# Patient Record
Sex: MALE | Race: WHITE | NOT HISPANIC OR LATINO | Employment: FULL TIME | URBAN - METROPOLITAN AREA
[De-identification: names, ages, dates, MRNs, and addresses within clinical notes are randomized per-mention and may not be internally consistent; named-entity substitution may affect disease eponyms.]

---

## 2022-02-18 ENCOUNTER — HOSPITAL ENCOUNTER (EMERGENCY)
Facility: CLINIC | Age: 57
Discharge: HOME | End: 2022-02-18
Attending: EMERGENCY MEDICINE | Admitting: EMERGENCY MEDICINE
Payer: COMMERCIAL

## 2022-02-18 ENCOUNTER — APPOINTMENT (OUTPATIENT)
Dept: CT IMAGING | Facility: CLINIC | Age: 57
End: 2022-02-18
Attending: EMERGENCY MEDICINE
Payer: COMMERCIAL

## 2022-02-18 ENCOUNTER — APPOINTMENT (OUTPATIENT)
Dept: ULTRASOUND IMAGING | Facility: CLINIC | Age: 57
End: 2022-02-18
Attending: EMERGENCY MEDICINE
Payer: COMMERCIAL

## 2022-02-18 VITALS
TEMPERATURE: 98.5 F | OXYGEN SATURATION: 92 % | HEART RATE: 64 BPM | SYSTOLIC BLOOD PRESSURE: 106 MMHG | WEIGHT: 220 LBS | HEIGHT: 68 IN | BODY MASS INDEX: 33.34 KG/M2 | DIASTOLIC BLOOD PRESSURE: 68 MMHG | RESPIRATION RATE: 25 BRPM

## 2022-02-18 DIAGNOSIS — K85.90 ACUTE PANCREATITIS, UNSPECIFIED COMPLICATION STATUS, UNSPECIFIED PANCREATITIS TYPE: ICD-10-CM

## 2022-02-18 LAB
ALBUMIN SERPL-MCNC: 3.8 G/DL (ref 3.5–5)
ALP SERPL-CCNC: 56 U/L (ref 45–120)
ALT SERPL W P-5'-P-CCNC: 28 U/L (ref 0–45)
ANION GAP SERPL CALCULATED.3IONS-SCNC: 10 MMOL/L (ref 5–18)
AST SERPL W P-5'-P-CCNC: 24 U/L (ref 0–40)
BASOPHILS # BLD AUTO: 0 10E3/UL (ref 0–0.2)
BASOPHILS NFR BLD AUTO: 0 %
BILIRUB DIRECT SERPL-MCNC: 0.2 MG/DL
BILIRUB SERPL-MCNC: 0.6 MG/DL (ref 0–1)
BUN SERPL-MCNC: 13 MG/DL (ref 8–22)
CALCIUM SERPL-MCNC: 9.4 MG/DL (ref 8.5–10.5)
CHLORIDE BLD-SCNC: 101 MMOL/L (ref 98–107)
CO2 SERPL-SCNC: 28 MMOL/L (ref 22–31)
CREAT SERPL-MCNC: 0.87 MG/DL (ref 0.7–1.3)
EOSINOPHIL # BLD AUTO: 0.1 10E3/UL (ref 0–0.7)
EOSINOPHIL NFR BLD AUTO: 1 %
ERYTHROCYTE [DISTWIDTH] IN BLOOD BY AUTOMATED COUNT: 13.1 % (ref 10–15)
GFR SERPL CREATININE-BSD FRML MDRD: >90 ML/MIN/1.73M2
GLUCOSE BLD-MCNC: 148 MG/DL (ref 70–125)
HCT VFR BLD AUTO: 44.1 % (ref 40–53)
HGB BLD-MCNC: 15.3 G/DL (ref 13.3–17.7)
IMM GRANULOCYTES # BLD: 0 10E3/UL
IMM GRANULOCYTES NFR BLD: 0 %
LIPASE SERPL-CCNC: 1116 U/L (ref 0–52)
LYMPHOCYTES # BLD AUTO: 2.2 10E3/UL (ref 0.8–5.3)
LYMPHOCYTES NFR BLD AUTO: 22 %
MCH RBC QN AUTO: 30.1 PG (ref 26.5–33)
MCHC RBC AUTO-ENTMCNC: 34.7 G/DL (ref 31.5–36.5)
MCV RBC AUTO: 87 FL (ref 78–100)
MONOCYTES # BLD AUTO: 0.9 10E3/UL (ref 0–1.3)
MONOCYTES NFR BLD AUTO: 10 %
NEUTROPHILS # BLD AUTO: 6.7 10E3/UL (ref 1.6–8.3)
NEUTROPHILS NFR BLD AUTO: 67 %
NRBC # BLD AUTO: 0 10E3/UL
NRBC BLD AUTO-RTO: 0 /100
PLATELET # BLD AUTO: 245 10E3/UL (ref 150–450)
POTASSIUM BLD-SCNC: 3.7 MMOL/L (ref 3.5–5)
PROT SERPL-MCNC: 7.2 G/DL (ref 6–8)
RBC # BLD AUTO: 5.09 10E6/UL (ref 4.4–5.9)
SODIUM SERPL-SCNC: 139 MMOL/L (ref 136–145)
TRIGL SERPL-MCNC: 137 MG/DL
WBC # BLD AUTO: 9.9 10E3/UL (ref 4–11)

## 2022-02-18 PROCEDURE — 83690 ASSAY OF LIPASE: CPT | Performed by: EMERGENCY MEDICINE

## 2022-02-18 PROCEDURE — 96374 THER/PROPH/DIAG INJ IV PUSH: CPT | Mod: 59

## 2022-02-18 PROCEDURE — 84478 ASSAY OF TRIGLYCERIDES: CPT | Performed by: EMERGENCY MEDICINE

## 2022-02-18 PROCEDURE — 250N000011 HC RX IP 250 OP 636: Performed by: EMERGENCY MEDICINE

## 2022-02-18 PROCEDURE — 76705 ECHO EXAM OF ABDOMEN: CPT

## 2022-02-18 PROCEDURE — 85049 AUTOMATED PLATELET COUNT: CPT | Performed by: EMERGENCY MEDICINE

## 2022-02-18 PROCEDURE — 74177 CT ABD & PELVIS W/CONTRAST: CPT

## 2022-02-18 PROCEDURE — 96361 HYDRATE IV INFUSION ADD-ON: CPT

## 2022-02-18 PROCEDURE — 96375 TX/PRO/DX INJ NEW DRUG ADDON: CPT

## 2022-02-18 PROCEDURE — 93005 ELECTROCARDIOGRAM TRACING: CPT | Performed by: EMERGENCY MEDICINE

## 2022-02-18 PROCEDURE — 82248 BILIRUBIN DIRECT: CPT | Performed by: EMERGENCY MEDICINE

## 2022-02-18 PROCEDURE — 99285 EMERGENCY DEPT VISIT HI MDM: CPT | Mod: 25

## 2022-02-18 PROCEDURE — 36415 COLL VENOUS BLD VENIPUNCTURE: CPT | Performed by: EMERGENCY MEDICINE

## 2022-02-18 PROCEDURE — 258N000003 HC RX IP 258 OP 636: Performed by: EMERGENCY MEDICINE

## 2022-02-18 RX ORDER — PRAZOSIN HYDROCHLORIDE 1 MG/1
1 CAPSULE ORAL EVERY EVENING
COMMUNITY
Start: 2022-02-06

## 2022-02-18 RX ORDER — FLUTICASONE PROPIONATE 50 MCG
2 SPRAY, SUSPENSION (ML) NASAL AT BEDTIME
COMMUNITY
Start: 2022-02-06

## 2022-02-18 RX ORDER — OXYCODONE HYDROCHLORIDE 5 MG/1
5 TABLET ORAL EVERY 6 HOURS PRN
Qty: 12 TABLET | Refills: 0 | Status: SHIPPED | OUTPATIENT
Start: 2022-02-18 | End: 2022-02-21

## 2022-02-18 RX ORDER — ZIPRASIDONE HYDROCHLORIDE 20 MG/1
20 CAPSULE ORAL DAILY
COMMUNITY
Start: 2021-12-22 | End: 2022-02-18

## 2022-02-18 RX ORDER — SUMATRIPTAN 100 MG/1
100 TABLET, FILM COATED ORAL DAILY PRN
COMMUNITY
Start: 2022-02-06

## 2022-02-18 RX ORDER — ONDANSETRON 4 MG/1
4 TABLET, ORALLY DISINTEGRATING ORAL EVERY 6 HOURS PRN
Qty: 12 TABLET | Refills: 0 | Status: SHIPPED | OUTPATIENT
Start: 2022-02-18 | End: 2022-02-21

## 2022-02-18 RX ORDER — LORATADINE 10 MG/1
10 CAPSULE, LIQUID FILLED ORAL DAILY
COMMUNITY

## 2022-02-18 RX ORDER — CARIPRAZINE 1.5 MG/1
1.5 CAPSULE, GELATIN COATED ORAL DAILY
COMMUNITY
Start: 2022-01-10

## 2022-02-18 RX ORDER — IOPAMIDOL 755 MG/ML
100 INJECTION, SOLUTION INTRAVASCULAR ONCE
Status: COMPLETED | OUTPATIENT
Start: 2022-02-18 | End: 2022-02-18

## 2022-02-18 RX ORDER — SIMVASTATIN 40 MG
40 TABLET ORAL AT BEDTIME
COMMUNITY
Start: 2022-01-27

## 2022-02-18 RX ORDER — DIVALPROEX SODIUM 500 MG/1
1000 TABLET, EXTENDED RELEASE ORAL AT BEDTIME
COMMUNITY
Start: 2022-02-06

## 2022-02-18 RX ORDER — DIVALPROEX SODIUM 500 MG/1
500 TABLET, EXTENDED RELEASE ORAL EVERY MORNING
COMMUNITY
Start: 2021-03-11

## 2022-02-18 RX ORDER — SILDENAFIL CITRATE 20 MG/1
20 TABLET ORAL DAILY PRN
COMMUNITY
Start: 2022-02-06

## 2022-02-18 RX ORDER — PROPRANOLOL HYDROCHLORIDE 10 MG/1
10 TABLET ORAL 2 TIMES DAILY
COMMUNITY
Start: 2021-12-28

## 2022-02-18 RX ORDER — LEVOTHYROXINE SODIUM 50 UG/1
50 TABLET ORAL DAILY
COMMUNITY
Start: 2022-02-06

## 2022-02-18 RX ORDER — DIMENHYDRINATE 50 MG
1 TABLET ORAL DAILY
COMMUNITY

## 2022-02-18 RX ORDER — HYDROXYZINE PAMOATE 50 MG/1
50 CAPSULE ORAL AT BEDTIME
COMMUNITY
Start: 2021-12-01

## 2022-02-18 RX ORDER — MULTIVIT WITH MINERALS/LUTEIN
1 TABLET ORAL DAILY
COMMUNITY

## 2022-02-18 RX ORDER — KETOROLAC TROMETHAMINE 15 MG/ML
15 INJECTION, SOLUTION INTRAMUSCULAR; INTRAVENOUS ONCE
Status: COMPLETED | OUTPATIENT
Start: 2022-02-18 | End: 2022-02-18

## 2022-02-18 RX ORDER — LANSOPRAZOLE 30 MG/1
30 CAPSULE, DELAYED RELEASE ORAL DAILY
COMMUNITY
Start: 2022-02-06

## 2022-02-18 RX ORDER — ZIPRASIDONE HYDROCHLORIDE 60 MG/1
60 CAPSULE ORAL DAILY
COMMUNITY
Start: 2021-12-28 | End: 2022-02-18

## 2022-02-18 RX ORDER — TRIAMTERENE AND HYDROCHLOROTHIAZIDE 37.5; 25 MG/1; MG/1
1 CAPSULE ORAL DAILY
COMMUNITY
Start: 2022-01-27

## 2022-02-18 RX ORDER — HYDROMORPHONE HYDROCHLORIDE 1 MG/ML
0.5 INJECTION, SOLUTION INTRAMUSCULAR; INTRAVENOUS; SUBCUTANEOUS ONCE
Status: COMPLETED | OUTPATIENT
Start: 2022-02-18 | End: 2022-02-18

## 2022-02-18 RX ORDER — QUETIAPINE FUMARATE 100 MG/1
3 TABLET, FILM COATED ORAL AT BEDTIME
COMMUNITY
Start: 2022-01-27

## 2022-02-18 RX ADMIN — KETOROLAC TROMETHAMINE 15 MG: 15 INJECTION, SOLUTION INTRAMUSCULAR; INTRAVENOUS at 12:15

## 2022-02-18 RX ADMIN — SODIUM CHLORIDE, POTASSIUM CHLORIDE, SODIUM LACTATE AND CALCIUM CHLORIDE 1000 ML: 600; 310; 30; 20 INJECTION, SOLUTION INTRAVENOUS at 13:25

## 2022-02-18 RX ADMIN — HYDROMORPHONE HYDROCHLORIDE 0.5 MG: 1 INJECTION, SOLUTION INTRAMUSCULAR; INTRAVENOUS; SUBCUTANEOUS at 15:08

## 2022-02-18 RX ADMIN — IOPAMIDOL 100 ML: 755 INJECTION, SOLUTION INTRAVENOUS at 14:30

## 2022-02-18 ASSESSMENT — ENCOUNTER SYMPTOMS
SHORTNESS OF BREATH: 0
DYSURIA: 0
NAUSEA: 1
ABDOMINAL PAIN: 1
WEAKNESS: 0
DIARRHEA: 0
FATIGUE: 0
APPETITE CHANGE: 0
VOMITING: 0
CHILLS: 0
HEADACHES: 0
COUGH: 0
FEVER: 0
CONSTIPATION: 0

## 2022-02-18 NOTE — ED PROVIDER NOTES
EMERGENCY DEPARTMENT ENCOUNTER      NAME: Sukhwinder Bustos  AGE: 56 year old male  YOB: 1965  MRN: [id number]  EVALUATION DATE & TIME: 2/18/2022 11:29 AM    PCP: No primary care provider on file.    ED PROVIDER: Denise Cardenas DO      Chief Complaint   Patient presents with     Abdominal Pain         FINAL IMPRESSION:  1. Acute pancreatitis, unspecified complication status, unspecified pancreatitis type          ED COURSE & MEDICAL DECISION MAKING:    Pertinent Labs & Imaging studies reviewed. (See chart for details)  11:55 AM I met with patient for initial interview and encounter. PPE worn includes surgical mask.  1:16 PM I updated with lab and imaging results.  3:08 PM I rechecked patient. Plan to do a trial of fluids.  3:58 PM Patient feeling improved and is agreeable to discharge.      56 year old male presents to the Emergency Department for evaluation of right upper quadrant and epigastric abdominal pain.    MEDICAL DECISION MAKING: I was concerned about this patient's UPPER ABDOMINAL pain and considered multiple causes including but not limited to the following.        Atypical Presentation for ACS: Patient does not have SOB or chest pain.  Screening EKG was without acute ischemic changes.    Transverse colitis / diverticulitis: Patient does not have diarrhea or fevers.     Appendicitis: Patient does not have RLQ TTP.      Pyelonephritis: Urine is not consistent with infection    Ureterolithiasis/Renal Colic:  Urine does not show blood.     Pancreatitis:  Lipase was elevated.  No alcohol.  No biliary cause.  Triglycerides not significantly elevated.    Biliary Colic:  ABD US is without gallstones and w/o signs of biliary obstruction. LFT's are normal.    Acute cholecystitis:  ABD US is without GB wall thickening and no pericholecystic fluid.     Others benign causes including: PUD, gastritis, GERD, intestinal colic / gas pains         IMPRESSION: Based on this patient's history, exam, and  diagnostic results, the working diagnosis of this patient's abdominal pain is acute pancreatitis.          DISPOSITION PLAN:    Discharge. Patient is appropriate for outpatient management with medications per discharge instructions.  His pain is controlled and he is tolerating oral intake.  He is not septic.  I put in referral for gastroenterology given acute pancreatitis with unknown cause.  Encourage close follow-up with his prime provider.  We discussed a clear liquid diet during this episode of pain and pain management.  Turn if intractable pain, fever or any other concerns.      At the conclusion of the encounter I discussed the results of all of the tests and the disposition. The questions were answered. The patient or family acknowledged understanding and was agreeable with the care plan.     MEDICATIONS GIVEN IN THE EMERGENCY:  Medications   ketorolac (TORADOL) injection 15 mg (15 mg Intravenous Given 2/18/22 1215)   lactated ringers BOLUS 1,000 mL (0 mLs Intravenous Stopped 2/18/22 1539)   iopamidol (ISOVUE-370) solution 100 mL (100 mLs Intravenous Given 2/18/22 1430)   HYDROmorphone (PF) (DILAUDID) injection 0.5 mg (0.5 mg Intravenous Given 2/18/22 1508)       NEW PRESCRIPTIONS STARTED AT TODAY'S ER VISIT  Discharge Medication List as of 2/18/2022  4:27 PM      START taking these medications    Details   ondansetron (ZOFRAN ODT) 4 MG ODT tab Take 1 tablet (4 mg) by mouth every 6 hours as needed for nausea, Disp-12 tablet, R-0, Local Print      oxyCODONE (ROXICODONE) 5 MG tablet Take 1 tablet (5 mg) by mouth every 6 hours as needed for pain, Disp-12 tablet, R-0, Local Print                =================================================================    HPI    Patient information was obtained from: Patient     Use of : N/A         Sukhwinder Bustos is a 56 year old male with a pertinent history of GERD, hiatal hernia, bipolar disorder who presents to this ED via walk-in for evaluation of abdominal  pain.    Patient reports he was awoken with 9/10 epigastric and RUQ abdominal pain around 1:00 AM this morning. He noted some chest pain at onset but states it quickly resolved. Reports associated nausea but no vomiting. States that he had to lay on his left side for the rest of the night as this was the only comfortable position. Reports that he has not taken any medication for this pain. He notes that he was able to eat normally this morning and this did not affect his pain. States his pain is currently 6/10 and his nausea has resolved. Denies any history of abdominal surgeries. Denies any diarrhea, constipation, fever, chills, shortness of breath, cough, or any other complaints.      REVIEW OF SYSTEMS   Review of Systems   Constitutional: Negative for appetite change, chills, fatigue and fever.   Respiratory: Negative for cough and shortness of breath.    Cardiovascular: Positive for chest pain (resolved).   Gastrointestinal: Positive for abdominal pain (RUQ and epigastric) and nausea (resolved). Negative for constipation, diarrhea and vomiting.   Genitourinary: Negative for dysuria.   Skin: Negative.    Neurological: Negative for syncope, weakness and headaches.   All other systems reviewed and are negative.      PAST MEDICAL HISTORY:  History reviewed. No pertinent past medical history.    PAST SURGICAL HISTORY:  History reviewed. No pertinent surgical history.        CURRENT MEDICATIONS:    calcium carbonate 600 mg-vitamin D 400 units (CALTRATE) 600-400 MG-UNIT per tablet  divalproex sodium extended-release (DEPAKOTE ER) 500 MG 24 hr tablet  divalproex sodium extended-release (DEPAKOTE ER) 500 MG 24 hr tablet  Flaxseed, Linseed, (FLAX SEED OIL) 1000 MG capsule  fluticasone (FLONASE) 50 MCG/ACT nasal spray  hydrOXYzine (VISTARIL) 50 MG capsule  LANsoprazole (PREVACID) 30 MG DR capsule  levothyroxine (SYNTHROID/LEVOTHROID) 50 MCG tablet  loratadine 10 MG capsule  multivitamin (CENTRUM SILVER) tablet  ondansetron  "(ZOFRAN ODT) 4 MG ODT tab  oxyCODONE (ROXICODONE) 5 MG tablet  prazosin (MINIPRESS) 1 MG capsule  propranolol (INDERAL) 10 MG tablet  QUEtiapine (SEROQUEL) 100 MG tablet  sildenafil (REVATIO) 20 MG tablet  simvastatin (ZOCOR) 40 MG tablet  SUMAtriptan (IMITREX) 100 MG tablet  triamterene-HCTZ (DYAZIDE) 37.5-25 MG capsule  VRAYLAR 1.5 MG CAPS capsule         ALLERGIES:  Allergies   Allergen Reactions     Isoniazid Other (See Comments)     Elevated LFTs     Paxil [Paroxetine] Other (See Comments)     Suicidal     Bactrim [Sulfamethoxazole W/Trimethoprim] Hives       FAMILY HISTORY:  No family history on file.    SOCIAL HISTORY:   Social History     Socioeconomic History     Marital status: Not on file     Spouse name: Not on file     Number of children: Not on file     Years of education: Not on file     Highest education level: Not on file   Occupational History     Not on file   Tobacco Use     Smoking status: Not on file     Smokeless tobacco: Not on file   Substance and Sexual Activity     Alcohol use: Not on file     Drug use: Not on file     Sexual activity: Not on file   Other Topics Concern     Not on file   Social History Narrative     Not on file     Social Determinants of Health     Financial Resource Strain: Not on file   Food Insecurity: Not on file   Transportation Needs: Not on file   Physical Activity: Not on file   Stress: Not on file   Social Connections: Not on file   Intimate Partner Violence: Not on file   Housing Stability: Not on file       VITALS:  /68   Pulse 64   Temp 98.5  F (36.9  C) (Oral)   Resp 25   Ht 1.715 m (5' 7.5\")   Wt 99.8 kg (220 lb)   SpO2 92%   BMI 33.95 kg/m      PHYSICAL EXAM    Physical Exam  Constitutional:       General: He is not in acute distress.  HENT:      Head: Normocephalic and atraumatic.      Mouth/Throat:      Pharynx: Oropharynx is clear.   Eyes:      Pupils: Pupils are equal, round, and reactive to light.   Cardiovascular:      Rate and Rhythm: " Normal rate and regular rhythm.      Pulses: Normal pulses.      Heart sounds: Normal heart sounds.   Pulmonary:      Effort: Pulmonary effort is normal.      Breath sounds: Normal breath sounds.   Abdominal:      General: Abdomen is flat. Bowel sounds are normal.      Palpations: Abdomen is soft.      Tenderness: There is abdominal tenderness in the right upper quadrant and epigastric area.   Musculoskeletal:         General: Normal range of motion.   Skin:     General: Skin is warm and dry.      Capillary Refill: Capillary refill takes less than 2 seconds.   Neurological:      General: No focal deficit present.      Mental Status: He is alert and oriented to person, place, and time.           LAB:  All pertinent labs reviewed and interpreted.  Results for orders placed or performed during the hospital encounter of 02/18/22   Abdomen US, limited (RUQ only)    Impression    IMPRESSION:  1.  Hepatic steatosis.  2.  Remaining examination.       Abd/pelvis CT,  IV  contrast only TRAUMA / AAA    Impression    IMPRESSION:   1.  Uncomplicated acute pancreatitis involving the neck, body and tail with mild to moderate inflammatory change but no peripancreatic fluid collection or evidence of necrosis.  2.  Mild chronic partial fatty replacement of the pancreatic parenchyma.  3.  Mild diffuse hepatic steatosis.  4.  Colonic diverticulosis.   Basic metabolic panel   Result Value Ref Range    Sodium 139 136 - 145 mmol/L    Potassium 3.7 3.5 - 5.0 mmol/L    Chloride 101 98 - 107 mmol/L    Carbon Dioxide (CO2) 28 22 - 31 mmol/L    Anion Gap 10 5 - 18 mmol/L    Urea Nitrogen 13 8 - 22 mg/dL    Creatinine 0.87 0.70 - 1.30 mg/dL    Calcium 9.4 8.5 - 10.5 mg/dL    Glucose 148 (H) 70 - 125 mg/dL    GFR Estimate >90 >60 mL/min/1.73m2   Hepatic function panel   Result Value Ref Range    Bilirubin Total 0.6 0.0 - 1.0 mg/dL    Bilirubin Direct 0.2 <=0.5 mg/dL    Protein Total 7.2 6.0 - 8.0 g/dL    Albumin 3.8 3.5 - 5.0 g/dL    Alkaline  Phosphatase 56 45 - 120 U/L    AST 24 0 - 40 U/L    ALT 28 0 - 45 U/L   Result Value Ref Range    Lipase 1,116 (H) 0 - 52 U/L   CBC with platelets and differential   Result Value Ref Range    WBC Count 9.9 4.0 - 11.0 10e3/uL    RBC Count 5.09 4.40 - 5.90 10e6/uL    Hemoglobin 15.3 13.3 - 17.7 g/dL    Hematocrit 44.1 40.0 - 53.0 %    MCV 87 78 - 100 fL    MCH 30.1 26.5 - 33.0 pg    MCHC 34.7 31.5 - 36.5 g/dL    RDW 13.1 10.0 - 15.0 %    Platelet Count 245 150 - 450 10e3/uL    % Neutrophils 67 %    % Lymphocytes 22 %    % Monocytes 10 %    % Eosinophils 1 %    % Basophils 0 %    % Immature Granulocytes 0 %    NRBCs per 100 WBC 0 <1 /100    Absolute Neutrophils 6.7 1.6 - 8.3 10e3/uL    Absolute Lymphocytes 2.2 0.8 - 5.3 10e3/uL    Absolute Monocytes 0.9 0.0 - 1.3 10e3/uL    Absolute Eosinophils 0.1 0.0 - 0.7 10e3/uL    Absolute Basophils 0.0 0.0 - 0.2 10e3/uL    Absolute Immature Granulocytes 0.0 <=0.4 10e3/uL    Absolute NRBCs 0.0 10e3/uL   Result Value Ref Range    Triglycerides 137 <=149 mg/dL       RADIOLOGY:  Reviewed all pertinent imaging. Please see official radiology report.  Abd/pelvis CT,  IV  contrast only TRAUMA / AAA   Final Result   IMPRESSION:    1.  Uncomplicated acute pancreatitis involving the neck, body and tail with mild to moderate inflammatory change but no peripancreatic fluid collection or evidence of necrosis.   2.  Mild chronic partial fatty replacement of the pancreatic parenchyma.   3.  Mild diffuse hepatic steatosis.   4.  Colonic diverticulosis.      Abdomen US, limited (RUQ only)   Final Result   IMPRESSION:   1.  Hepatic steatosis.   2.  Remaining examination.              EKG:    Performed at: 1219    Impression:   Sinus rhythm  Left axis deviation    Rate: 62  Rhythm: Sinus rhythm  Axis: -30  KS Interval: 158  QRS Interval: 102  QTc Interval: 408  ST Changes: none   Comparison: none available    I have independently reviewed and interpreted the EKG(s) documented above.      Apolinar KIM  Eduardo, am serving as a scribe to document services personally performed by Dr. Denise Cardenas based on my observation and the provider's statements to me. I, Denise Cardenas, DO attest that Apolinar Clark is acting in a scribe capacity, has observed my performance of the services and has documented them in accordance with my direction.    Denise Cardenas DO  Emergency Medicine  [facility] EMERGENCY ROOM  [address] [id number]  [phone]  Dept: [phone]     Denise Cardenas DO  02/18/22 3254

## 2022-02-18 NOTE — DISCHARGE INSTRUCTIONS
[facility] to coordinate your care as prescribed by the provider.  If you don t hear from a representative within 2 business days, please call the number listed above.    I also gave information for [facility] if you would prefer to see them    Clear liquid diet through the weekend, increase diet as you tolerate  Roxicodone for severe pain.  Tylenol every 6 hours  Zofran as needed for nausea    Return if unable to control pain or worsening symptoms    You have been prescribed a narcotic pain medication that has risk for addiction with prolonged use, so please use sparingly.  Additionally, narcotics are medications that are sedating (will make you sleepy), so do not drive or operate machinery while taking this medication.  Avoid alcohol or other sedating medicines such as benzodiazepines while taking narcotics due to risk for increased sedation and difficulty breathing.      Narcotics will cause constipation.  If you need to take this medication please consider taking an over-the-counter stool softener and laxative, such as Senna Plus, to prevent constipation from developing.  Nausea is a side effect of narcotic use.  Possible additional side effects include vomiting, itching, and dizziness/lightheadedness.

## 2022-02-18 NOTE — ED TRIAGE NOTES
Arrives to ED with c/o moderate to severe upper abd pain woke pt from sleep at 0100 this morning. Reports pain midline to RUQ. Denies hx of abd surgeries. +nausea. Denies diarrhea/constipation. Hx of GERD and hiatal hernia.

## 2022-02-18 NOTE — PHARMACY-ADMISSION MEDICATION HISTORY
Pharmacy Note - Admission Medication History    Pertinent Provider Information: Patient has recently tapered off Geodon per psych recommendations and last dose was 2 days ago (2/16).     ______________________________________________________________________    Prior To Admission (PTA) med list completed and updated in EMR.       PTA Med List   Medication Sig Last Dose     calcium carbonate 600 mg-vitamin D 400 units (CALTRATE) 600-400 MG-UNIT per tablet Take 1 tablet by mouth daily 2/18/2022 at Unknown time     divalproex sodium extended-release (DEPAKOTE ER) 500 MG 24 hr tablet Take 500 mg by mouth every morning 2/18/2022 at Unknown time     divalproex sodium extended-release (DEPAKOTE ER) 500 MG 24 hr tablet Take 1,000 mg by mouth At Bedtime 2/17/2022 at Unknown time     Flaxseed, Linseed, (FLAX SEED OIL) 1000 MG capsule Take 1 capsule by mouth daily 2/18/2022 at Unknown time     fluticasone (FLONASE) 50 MCG/ACT nasal spray Spray 2 sprays into both nostrils At Bedtime  2/17/2022     hydrOXYzine (VISTARIL) 50 MG capsule Take 50 mg by mouth At Bedtime 2/17/2022 at Unknown time     LANsoprazole (PREVACID) 30 MG DR capsule Take 30 mg by mouth daily 2/18/2022 at Unknown time     levothyroxine (SYNTHROID/LEVOTHROID) 50 MCG tablet Take 50 mcg by mouth daily 2/18/2022 at Unknown time     loratadine 10 MG capsule Take 10 mg by mouth daily 2/18/2022 at Unknown time     multivitamin (CENTRUM SILVER) tablet Take 1 tablet by mouth daily 2/18/2022 at Unknown time     prazosin (MINIPRESS) 1 MG capsule Take 1 mg by mouth every evening 2/17/2022 at Unknown time     propranolol (INDERAL) 10 MG tablet Take 10 mg by mouth 2 times daily 2/18/2022 at 1/2     QUEtiapine (SEROQUEL) 100 MG tablet Take 3 tablets by mouth At Bedtime 2/17/2022 at Unknown time     sildenafil (REVATIO) 20 MG tablet Take 20 mg by mouth daily as needed 2/17/2022 at Unknown time     simvastatin (ZOCOR) 40 MG tablet Take 40 mg by mouth At Bedtime 2/17/2022 at  Unknown time     SUMAtriptan (IMITREX) 100 MG tablet Take 100 mg by mouth daily as needed May repeat x1 in 2 hours if headache persists More than a month at Unknown time     triamterene-HCTZ (DYAZIDE) 37.5-25 MG capsule Take 1 capsule by mouth daily 2/18/2022 at Unknown time     VRAYLAR 1.5 MG CAPS capsule Take 1.5 mg by mouth daily 2/18/2022 at Unknown time       Information source(s): Patient and CareEverywhere/SureScripts  Method of interview communication: in-person    Summary of Changes to PTA Med List  New: none  Discontinued: Geodon  Changed: none    Patient was asked about OTC/herbal products specifically.  PTA med list reflects this.    In the past week, patient estimated taking medication this percent of the time:  greater than 90%.    Allergies were reviewed, assessed, and updated with the patient.      Patient does not use any multi-dose medications prior to admission.    The information provided in this note is only as accurate as the sources available at the time of the update(s).    Thank you for the opportunity to participate in the care of this patient.    Lindsay Babcock [facility]  2/18/2022 4:03 PM

## 2022-02-21 ENCOUNTER — TELEPHONE (OUTPATIENT)
Dept: GASTROENTEROLOGY | Facility: CLINIC | Age: 57
End: 2022-02-21
Payer: COMMERCIAL

## 2022-02-21 NOTE — CONFIDENTIAL NOTE
[facility]     Referring provider: Denise Cardenas DO    Referred to: [facility]     Provider Requested: none specified     Referral Received: 2/18/22     Records received: Epic    Lipase 1116 on 2/18/22    CT Abdomen/pelvis 2/18/22  IMPRESSION:   1.  Uncomplicated acute pancreatitis involving the neck, body and tail with mild to moderate inflammatory change but no peripancreatic fluid collection or evidence of necrosis.  2.  Mild chronic partial fatty replacement of the pancreatic parenchyma.  3.  Mild diffuse hepatic steatosis.  4.  Colonic diverticulosis.    US Abdomen 2/18/22    IMPRESSION:  1.  Hepatic steatosis.  2.  Remaining examination     Images received: PACs    Evaluation for: Acute pancreatitis, unspecified complication status, unspecified pancreatitis type     Clinical History (per RN review):   56 year old male with a pertinent history of GERD, hiatal hernia, bipolar disorder who presents to this ED via walk-in for evaluation of abdominal pain.     Patient reports he was awoken with 9/10 epigastric and RUQ abdominal pain around 1:00 AM this morning. He noted some chest pain at onset but states it quickly resolved. Reports associated nausea but no vomiting. States that he had to lay on his left side for the rest of the night as this was the only comfortable position. Reports that he has not taken any medication for this pain. He notes that he was able to eat normally this morning and this did not affect his pain. States his pain is currently 6/10 and his nausea has resolved. Denies any history of abdominal surgeries. Denies any diarrhea, constipation, fever, chills, shortness of breath, cough, or any other complaints.  MD review date:   MD Decision for clinic consultation/Orders:            Referral updates/Patient contacted:

## 2022-02-22 ENCOUNTER — PATIENT OUTREACH (OUTPATIENT)
Dept: GASTROENTEROLOGY | Facility: CLINIC | Age: 57
End: 2022-02-22
Payer: COMMERCIAL

## 2022-02-22 DIAGNOSIS — K85.90 ACUTE PANCREATITIS: Primary | ICD-10-CM

## 2022-02-22 NOTE — PROGRESS NOTES
Called pt to discuss referral and Dr Maier's recommendations  MRI/MRCP about 4-6 weeks after he presented to the ED and then I'll see him in clinic. Depending on the findings, we may end up doing an EUS.     Order for MRCP placed, pt would like this done through [facility], he will obtain radiology fax number and follow up with me so order can be faxed.    Tentatively plan for MRCP week of 3/21 and clinic date of 3/30    Zahra Claros RN, BSN,   [facility]  Care coordinator

## 2022-02-25 NOTE — PROGRESS NOTES
Called pt as follow up to prior conversation. [facility] Told him to contact us if a repeat referral is needed, that current referral will be closed.  Pt verbalized understanding

## 2022-03-09 LAB
ATRIAL RATE - MUSE: 62 BPM
DIASTOLIC BLOOD PRESSURE - MUSE: 73 MMHG
INTERPRETATION ECG - MUSE: NORMAL
P AXIS - MUSE: 19 DEGREES
PR INTERVAL - MUSE: 158 MS
QRS DURATION - MUSE: 102 MS
QT - MUSE: 402 MS
QTC - MUSE: 408 MS
R AXIS - MUSE: -30 DEGREES
SYSTOLIC BLOOD PRESSURE - MUSE: 118 MMHG
T AXIS - MUSE: 52 DEGREES
VENTRICULAR RATE- MUSE: 62 BPM

## 2022-05-07 ENCOUNTER — HEALTH MAINTENANCE LETTER (OUTPATIENT)
Age: 57
End: 2022-05-07

## 2023-04-22 ENCOUNTER — HEALTH MAINTENANCE LETTER (OUTPATIENT)
Age: 58
End: 2023-04-22

## 2023-06-02 ENCOUNTER — HEALTH MAINTENANCE LETTER (OUTPATIENT)
Age: 58
End: 2023-06-02

## 2024-06-29 ENCOUNTER — HEALTH MAINTENANCE LETTER (OUTPATIENT)
Age: 59
End: 2024-06-29